# Patient Record
Sex: FEMALE | Race: WHITE | URBAN - NONMETROPOLITAN AREA
[De-identification: names, ages, dates, MRNs, and addresses within clinical notes are randomized per-mention and may not be internally consistent; named-entity substitution may affect disease eponyms.]

---

## 2019-07-31 ENCOUNTER — APPOINTMENT (RX ONLY)
Dept: URBAN - NONMETROPOLITAN AREA CLINIC 1 | Facility: CLINIC | Age: 24
Setting detail: DERMATOLOGY
End: 2019-07-31

## 2019-07-31 DIAGNOSIS — D22 MELANOCYTIC NEVI: ICD-10-CM

## 2019-07-31 DIAGNOSIS — D485 NEOPLASM OF UNCERTAIN BEHAVIOR OF SKIN: ICD-10-CM

## 2019-07-31 DIAGNOSIS — D18.0 HEMANGIOMA: ICD-10-CM

## 2019-07-31 DIAGNOSIS — L82.1 OTHER SEBORRHEIC KERATOSIS: ICD-10-CM

## 2019-07-31 DIAGNOSIS — L81.4 OTHER MELANIN HYPERPIGMENTATION: ICD-10-CM

## 2019-07-31 PROBLEM — D22.5 MELANOCYTIC NEVI OF TRUNK: Status: ACTIVE | Noted: 2019-07-31

## 2019-07-31 PROBLEM — D48.5 NEOPLASM OF UNCERTAIN BEHAVIOR OF SKIN: Status: ACTIVE | Noted: 2019-07-31

## 2019-07-31 PROBLEM — D18.01 HEMANGIOMA OF SKIN AND SUBCUTANEOUS TISSUE: Status: ACTIVE | Noted: 2019-07-31

## 2019-07-31 PROCEDURE — 99203 OFFICE O/P NEW LOW 30 MIN: CPT | Mod: 25

## 2019-07-31 PROCEDURE — 11102 TANGNTL BX SKIN SINGLE LES: CPT

## 2019-07-31 PROCEDURE — ? BIOPSY BY SHAVE METHOD

## 2019-07-31 PROCEDURE — ? INVENTORY

## 2019-07-31 PROCEDURE — ? COUNSELING

## 2019-07-31 ASSESSMENT — LOCATION DETAILED DESCRIPTION DERM
LOCATION DETAILED: LEFT LATERAL SUPERIOR CHEST
LOCATION DETAILED: LEFT INFERIOR UPPER BACK

## 2019-07-31 ASSESSMENT — LOCATION SIMPLE DESCRIPTION DERM
LOCATION SIMPLE: LEFT UPPER BACK
LOCATION SIMPLE: CHEST

## 2019-07-31 ASSESSMENT — LOCATION ZONE DERM: LOCATION ZONE: TRUNK

## 2019-07-31 NOTE — PROCEDURE: BIOPSY BY SHAVE METHOD
Post-Care Instructions: Detail post-care instructions were given and reviewed. Patient is to keep the biopsy site dry overnight, and then apply vaseline daily until healed. Discussed to call the office if any questions or problems at [phone]. Post-Care Instructions: Detail post-care instructions were given and reviewed. Patient is to keep the biopsy site dry overnight, and then apply vaseline daily until healed. Discussed to call the office if any questions or problems at [phone].